# Patient Record
Sex: FEMALE | Race: WHITE | NOT HISPANIC OR LATINO | Employment: OTHER | URBAN - METROPOLITAN AREA
[De-identification: names, ages, dates, MRNs, and addresses within clinical notes are randomized per-mention and may not be internally consistent; named-entity substitution may affect disease eponyms.]

---

## 2024-05-31 ENCOUNTER — TELEPHONE (OUTPATIENT)
Dept: SURGERY | Facility: CLINIC | Age: 69
End: 2024-05-31
Payer: MEDICARE

## 2024-05-31 NOTE — TELEPHONE ENCOUNTER
----- Message from Stephy Owusu sent at 5/31/2024  8:12 AM CDT -----  Contact: Shobha [phone]  Patients daughter called in this morning needing to schedule an appointment. [facility] [phone] thanks marisabel

## 2024-06-13 PROBLEM — N64.4 MASTODYNIA OF LEFT BREAST: Status: ACTIVE | Noted: 2024-06-13

## 2024-06-14 ENCOUNTER — OFFICE VISIT (OUTPATIENT)
Dept: SURGERY | Facility: CLINIC | Age: 69
End: 2024-06-14
Payer: MEDICARE

## 2024-06-14 DIAGNOSIS — Z80.3 FAMILY HISTORY OF MALIGNANT NEOPLASM OF BREAST: ICD-10-CM

## 2024-06-14 DIAGNOSIS — N64.4 BREAST PAIN, LEFT: ICD-10-CM

## 2024-06-14 DIAGNOSIS — N64.4 MASTODYNIA OF LEFT BREAST: Primary | ICD-10-CM

## 2024-06-14 PROCEDURE — 99999 PR PBB SHADOW E&M-EST. PATIENT-LVL III: CPT | Mod: PBBFAC,,, | Performed by: NURSE PRACTITIONER

## 2024-06-14 PROCEDURE — 1159F MED LIST DOCD IN RCRD: CPT | Mod: CPTII,S$GLB,, | Performed by: NURSE PRACTITIONER

## 2024-06-14 PROCEDURE — 1160F RVW MEDS BY RX/DR IN RCRD: CPT | Mod: CPTII,S$GLB,, | Performed by: NURSE PRACTITIONER

## 2024-06-14 PROCEDURE — 99203 OFFICE O/P NEW LOW 30 MIN: CPT | Mod: S$GLB,,, | Performed by: NURSE PRACTITIONER

## 2024-06-14 PROCEDURE — 1126F AMNT PAIN NOTED NONE PRSNT: CPT | Mod: CPTII,S$GLB,, | Performed by: NURSE PRACTITIONER

## 2024-06-14 NOTE — PROGRESS NOTES
[facility]  MD David Malhotra, NP-C    Date of Service: 6/14/2024    Chief Complaint:   Lacy Cano is a 68 y.o. female presenting today for left  breast pain that she first noticed a year ago. It has become more prevalent in the last few months.  Her imaging has been normal.  She reports no interval changes on her self-breast examination other than the pain.     History of Present Illness:   Mrs. Lacy Cano  presents on 06/15/2024 due to left breast pain.  Her imaging has been normal.  MD::: Kory Rich MD    Past Medical History:   Diagnosis Date    Chronic cervical pain     Diverticulosis     DJD (degenerative joint disease), cervical     Family history of malignant neoplasm of breast 06/15/2024    Fibromyalgia     High cholesterol     Hormone replacement therapy (postmenopausal)     Hypertension     Mastodynia of left breast 06/13/2024    Obesity     Osteoarthritis       Past Surgical History:   Procedure Laterality Date    ABDOMINAL SURGERY      BACK SURGERY      bilateral breast reduction      CERVICAL FUSION      CHOLECYSTECTOMY      COLON RESECTION  04/28/2023    HYSTERECTOMY      LUMBAR FUSION      TONSILLECTOMY          Current Outpatient Medications:     atorvastatin (LIPITOR) 20 MG tablet, , Disp: , Rfl:     clotrimazole-betamethasone 1-0.05% (LOTRISONE) cream, APPLY TO THE AFFECTED AREA TWICE A DAY, Disp: 15 g, Rfl: 0    dicyclomine (BENTYL) 10 MG capsule, TAKE ONE CAPSULE BY MOUTH THREE TIMES DAILY THANK YOU, Disp: , Rfl:     ergocalciferol (ERGOCALCIFEROL) 50,000 unit Cap, Take 50,000 Units by mouth every 7 days., Disp: , Rfl:     estradioL (ESTRACE) 0.5 MG tablet, TAKE 1 TABLET ONE TIME DAILY, Disp: 90 tablet, Rfl: 3    etodolac (LODINE) 400 MG tablet, , Disp: , Rfl:     fluticasone (FLONASE) 50 mcg/actuation nasal spray, 1 spray by Each Nare route 2 (two) times daily., Disp: , Rfl:     gabapentin (NEURONTIN) 300 MG capsule,  Take 1 capsule by mouth 4 (four) times daily. , Disp: , Rfl:     hydroCHLOROthiazide (HYDRODIURIL) 25 MG tablet, Take 12.5 mg by mouth every morning., Disp: , Rfl:     ketoconazole (NIZORAL) 2 % cream, ketoconazole 2 % topical cream  APPLY TOPICALLY TWICE DAILY FOR 2 WEEKS THANK YOU, Disp: , Rfl:     ketoconazole (NIZORAL) 2 % cream, APPLY TOPICALLY TWICE DAILY FOR 2 WEEKS THANK YOU, Disp: , Rfl:     lifitegrast (XIIDRA) 5 % Dpet, Xiidra 5 % eye drops in a dropperette, Disp: , Rfl:     methocarbamoL (ROBAXIN) 500 MG Tab, Take 1 tablet by mouth 4 (four) times daily. , Disp: , Rfl:     pantoprazole (PROTONIX) 40 MG tablet, , Disp: , Rfl:     phenazopyridine (PYRIDIUM) 200 MG tablet, TAKE ONE TABLET BY MOUTH THREE TIMES DAILY AS NEEDED AFTER MEALS THANK YOU, Disp: , Rfl:     sulfamethoxazole-trimethoprim 800-160mg (BACTRIM DS) 800-160 mg Tab, , Disp: , Rfl:     tramadol-acetaminophen 37.5-325 mg (ULTRACET) 37.5-325 mg Tab, Take 1 tablet by mouth 4 (four) times daily., Disp: , Rfl:     TRUE METRIX GLUCOSE METER Misc, USE TO TEST BLOOD SUGAR THREE TIMES DAILY THANK YOU, Disp: , Rfl:     TRUE METRIX GLUCOSE TEST STRIP Strp, USE TO TEST BLOOD SUGAR THREE TIMES DAILY THANK YOU, Disp: , Rfl:     TRUEPLUS LANCETS 33 gauge Misc, USE TO TEST BLOOD SUGAR THREE TIMES DAILY THANK YOU, Disp: , Rfl:     aspirin (ECOTRIN) 81 MG EC tablet, Take 1 tablet by mouth every morning. (Patient not taking: Reported on 5/23/2024), Disp: , Rfl:     atenoloL (TENORMIN) 25 MG tablet, TAKE ONE TABLET BY MOUTH EVERY NIGHT AT BEDTIME THANK YOU (Patient not taking: Reported on 6/14/2024), Disp: , Rfl:    Review of patient's allergies indicates:   Allergen Reactions    Pseudoephedrine tannate Hives and Swelling    Pseudoephedrine-dm-guaifenesin Hives      Social History     Tobacco Use    Smoking status: Never    Smokeless tobacco: Never   Substance Use Topics    Alcohol use: No      Family History   Problem Relation Name Age of Onset    Arthritis  Mother      Aortic aneurysm Mother      Cirrhosis Father      Hepatitis Father      Cancer Brother      Hypertension Brother      Stroke Brother      Diabetes Brother      Breast cancer Maternal Aunt  60 - 69    Breast cancer Paternal Uncle  60 - 65    Ovarian cancer Neg Hx          Review of Systems   Integumentary:  Positive for breast tenderness. Negative for color change, rash, mole/lesion, breast mass and breast discharge.   Breast: Positive for tenderness.Negative for mass       Physical Exam   Constitutional: She appears well-developed. She is cooperative.   HENT:   Head: Normocephalic.   Cardiovascular:  Normal rate and regular rhythm.            Pulmonary/Chest: She exhibits no tenderness and no bony tenderness. Right breast exhibits no mass, no nipple discharge, no skin change and no tenderness. Left breast exhibits no mass, no nipple discharge, no skin change and no tenderness.   Abdominal: Soft. Normal appearance.   Musculoskeletal: Lymphadenopathy:      Upper Body:      Right upper body: No supraclavicular or axillary adenopathy.      Left upper body: No supraclavicular or axillary adenopathy.     Neurological: She is alert.   Skin: No rash noted.     Mammogram: [facility]: 5/23/2024- This procedure was performed using tomosynthesis. Computer-aided detection was utilized in the interpretation of this examination. There are scattered areas of fibroglandular density. There is no evidence of suspicious masses, calcifications, or other abnormal findings. Impression: Bilateral There is no mammographic evidence of malignancy.    1. Mastodynia of left breast  Assessment & Plan:  We discussed our fibrocystic mastopathy protocol in detail. She knows that if she follows this protocol - that her symptoms should improve.  We discussed how breast pain is usually not associated with breast cancer, however, pain can be the presenting symptom with some cancers (but this could be coincidental). Still, if her pain does not  improve in 8-12 weeks she should call us back for additional recommendations.        2. Family history of malignant neoplasm of breast  Assessment & Plan:  She's going to clarify her family history and we will discuss further at her next visit        3. Breast pain, left  -     Ambulatory referral/consult to Breast Surgery         Medical Decision Making:  It is my impression that this patient suffers all conditions contained in this medical document.  Each of these conditions did affect our plan of care and my medical decision making today.  It is my opinion that the medical decision making concerning this patient was of moderate difficulty based on the aforementioned conditions.  Any further recommendations will be communicated to the patient by me.  I have reviewed and verified her allergies, list of medications, medical and surgical histories, social history, and a pertinent review of symptoms.      Follow up:  6 months and prn    For:  Physical Examination

## 2024-06-15 PROBLEM — Z80.3 FAMILY HISTORY OF MALIGNANT NEOPLASM OF BREAST: Status: ACTIVE | Noted: 2024-06-15
